# Patient Record
Sex: FEMALE | Race: WHITE | Employment: OTHER | URBAN - METROPOLITAN AREA
[De-identification: names, ages, dates, MRNs, and addresses within clinical notes are randomized per-mention and may not be internally consistent; named-entity substitution may affect disease eponyms.]

---

## 2017-01-02 ENCOUNTER — HOSPITAL ENCOUNTER (EMERGENCY)
Facility: CLINIC | Age: 72
Discharge: HOME | End: 2017-01-02
Attending: PHYSICIAN ASSISTANT | Admitting: PHYSICIAN ASSISTANT
Payer: MEDICARE

## 2017-01-02 DIAGNOSIS — N39.0 URINARY TRACT INFECTION IN FEMALE: ICD-10-CM

## 2017-01-02 DIAGNOSIS — R19.7 NAUSEA VOMITING AND DIARRHEA: ICD-10-CM

## 2017-01-02 DIAGNOSIS — R11.2 NAUSEA VOMITING AND DIARRHEA: ICD-10-CM

## 2017-01-02 LAB
ALBUMIN SERPL-MCNC: 3.7 G/DL (ref 3.4–5)
ALBUMIN UR-MCNC: 100 MG/DL
ALP SERPL-CCNC: 81 U/L (ref 40–150)
ALT SERPL W P-5'-P-CCNC: 37 U/L (ref 0–50)
ANION GAP SERPL CALCULATED.3IONS-SCNC: 18 MMOL/L (ref 3–14)
APPEARANCE UR: ABNORMAL
AST SERPL W P-5'-P-CCNC: 62 U/L (ref 0–45)
BACTERIA #/AREA URNS HPF: ABNORMAL /HPF
BASOPHILS # BLD AUTO: 0 10E9/L (ref 0–0.2)
BASOPHILS NFR BLD AUTO: 0.2 %
BILIRUB SERPL-MCNC: 0.6 MG/DL (ref 0.2–1.3)
BILIRUB UR QL STRIP: ABNORMAL
BUN SERPL-MCNC: 48 MG/DL (ref 7–30)
CALCIUM SERPL-MCNC: 8.6 MG/DL (ref 8.5–10.1)
CHLORIDE SERPL-SCNC: 101 MMOL/L (ref 94–109)
CO2 SERPL-SCNC: 21 MMOL/L (ref 20–32)
COLOR UR AUTO: YELLOW
CREAT SERPL-MCNC: 1.46 MG/DL (ref 0.52–1.04)
DIFFERENTIAL METHOD BLD: ABNORMAL
EOSINOPHIL # BLD AUTO: 0 10E9/L (ref 0–0.7)
EOSINOPHIL NFR BLD AUTO: 0.2 %
ERYTHROCYTE [DISTWIDTH] IN BLOOD BY AUTOMATED COUNT: 13.4 % (ref 10–15)
GFR SERPL CREATININE-BSD FRML MDRD: 35 ML/MIN/1.7M2
GLUCOSE SERPL-MCNC: 160 MG/DL (ref 70–99)
GLUCOSE UR STRIP-MCNC: NEGATIVE MG/DL
HCT VFR BLD AUTO: 32.8 % (ref 35–47)
HEMOCCULT STL QL: POSITIVE
HGB BLD-MCNC: 10.8 G/DL (ref 11.7–15.7)
HGB UR QL STRIP: ABNORMAL
IMM GRANULOCYTES # BLD: 0.1 10E9/L (ref 0–0.4)
IMM GRANULOCYTES NFR BLD: 0.4 %
KETONES UR STRIP-MCNC: 15 MG/DL
LEUKOCYTE ESTERASE UR QL STRIP: ABNORMAL
LYMPHOCYTES # BLD AUTO: 0.8 10E9/L (ref 0.8–5.3)
LYMPHOCYTES NFR BLD AUTO: 6.7 %
MCH RBC QN AUTO: 29.8 PG (ref 26.5–33)
MCHC RBC AUTO-ENTMCNC: 32.9 G/DL (ref 31.5–36.5)
MCV RBC AUTO: 91 FL (ref 78–100)
MONOCYTES # BLD AUTO: 0.8 10E9/L (ref 0–1.3)
MONOCYTES NFR BLD AUTO: 6.7 %
NEUTROPHILS # BLD AUTO: 10.6 10E9/L (ref 1.6–8.3)
NEUTROPHILS NFR BLD AUTO: 85.8 %
NITRATE UR QL: NEGATIVE
NON-SQ EPI CELLS #/AREA URNS LPF: ABNORMAL /LPF
PH UR STRIP: 6 PH (ref 5–7)
PLATELET # BLD AUTO: 349 10E9/L (ref 150–450)
POTASSIUM SERPL-SCNC: 3.7 MMOL/L (ref 3.4–5.3)
PROT SERPL-MCNC: 7.4 G/DL (ref 6.8–8.8)
RBC # BLD AUTO: 3.62 10E12/L (ref 3.8–5.2)
RBC #/AREA URNS AUTO: ABNORMAL /HPF (ref 0–2)
SODIUM SERPL-SCNC: 140 MMOL/L (ref 133–144)
SP GR UR STRIP: 1.02 (ref 1–1.03)
TRANS CELLS #/AREA URNS HPF: ABNORMAL /HPF
URN SPEC COLLECT METH UR: ABNORMAL
UROBILINOGEN UR STRIP-ACNC: 0.2 EU/DL (ref 0.2–1)
WBC # BLD AUTO: 12.3 10E9/L (ref 4–11)
WBC #/AREA URNS AUTO: >100 /HPF (ref 0–2)

## 2017-01-02 PROCEDURE — 25000125 ZZHC RX 250: Performed by: PHYSICIAN ASSISTANT

## 2017-01-02 PROCEDURE — 96361 HYDRATE IV INFUSION ADD-ON: CPT

## 2017-01-02 PROCEDURE — 82272 OCCULT BLD FECES 1-3 TESTS: CPT | Performed by: PHYSICIAN ASSISTANT

## 2017-01-02 PROCEDURE — 96376 TX/PRO/DX INJ SAME DRUG ADON: CPT

## 2017-01-02 PROCEDURE — 80053 COMPREHEN METABOLIC PANEL: CPT | Performed by: PHYSICIAN ASSISTANT

## 2017-01-02 PROCEDURE — 99284 EMERGENCY DEPT VISIT MOD MDM: CPT | Mod: 25

## 2017-01-02 PROCEDURE — 25000128 H RX IP 250 OP 636: Performed by: PHYSICIAN ASSISTANT

## 2017-01-02 PROCEDURE — 81001 URINALYSIS AUTO W/SCOPE: CPT | Performed by: PHYSICIAN ASSISTANT

## 2017-01-02 PROCEDURE — 85025 COMPLETE CBC W/AUTO DIFF WBC: CPT | Performed by: PHYSICIAN ASSISTANT

## 2017-01-02 PROCEDURE — 99284 EMERGENCY DEPT VISIT MOD MDM: CPT | Performed by: PHYSICIAN ASSISTANT

## 2017-01-02 PROCEDURE — 96374 THER/PROPH/DIAG INJ IV PUSH: CPT

## 2017-01-02 PROCEDURE — 96375 TX/PRO/DX INJ NEW DRUG ADDON: CPT

## 2017-01-02 RX ORDER — SODIUM CHLORIDE 9 MG/ML
1000 INJECTION, SOLUTION INTRAVENOUS CONTINUOUS
Status: DISCONTINUED | OUTPATIENT
Start: 2017-01-02 | End: 2017-01-03 | Stop reason: HOSPADM

## 2017-01-02 RX ORDER — ONDANSETRON 4 MG/1
4 TABLET, ORALLY DISINTEGRATING ORAL EVERY 8 HOURS PRN
Qty: 10 TABLET | Refills: 0 | Status: SHIPPED | OUTPATIENT
Start: 2017-01-02 | End: 2017-01-05

## 2017-01-02 RX ORDER — CIPROFLOXACIN 500 MG/1
500 TABLET, FILM COATED ORAL 2 TIMES DAILY
Qty: 6 TABLET | Refills: 0 | Status: SHIPPED | OUTPATIENT
Start: 2017-01-02 | End: 2017-01-05

## 2017-01-02 RX ORDER — ONDANSETRON 2 MG/ML
4 INJECTION INTRAMUSCULAR; INTRAVENOUS EVERY 30 MIN PRN
Status: DISCONTINUED | OUTPATIENT
Start: 2017-01-02 | End: 2017-01-03 | Stop reason: HOSPADM

## 2017-01-02 RX ADMIN — SODIUM CHLORIDE 1000 ML: 9 INJECTION, SOLUTION INTRAVENOUS at 18:34

## 2017-01-02 RX ADMIN — SODIUM CHLORIDE 1000 ML: 9 INJECTION, SOLUTION INTRAVENOUS at 19:39

## 2017-01-02 RX ADMIN — PROCHLORPERAZINE EDISYLATE 5 MG: 5 INJECTION INTRAMUSCULAR; INTRAVENOUS at 20:57

## 2017-01-02 RX ADMIN — ONDANSETRON HYDROCHLORIDE 4 MG: 2 SOLUTION INTRAMUSCULAR; INTRAVENOUS at 19:57

## 2017-01-02 RX ADMIN — ONDANSETRON HYDROCHLORIDE 4 MG: 2 SOLUTION INTRAMUSCULAR; INTRAVENOUS at 18:24

## 2017-01-02 ASSESSMENT — ENCOUNTER SYMPTOMS
DIFFICULTY URINATING: 0
BLOOD IN STOOL: 1
FEVER: 0
VOMITING: 1
APPETITE CHANGE: 1
DYSURIA: 0
DIARRHEA: 1
SHORTNESS OF BREATH: 0
NAUSEA: 1
CHILLS: 0
HEADACHES: 1

## 2017-01-02 NOTE — ED PROVIDER NOTES
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  She has not kept anything down including her home medications were clear liquids.  She denies localized abdominal pain, just mild diffuse cramping at times. No fever or chills. Denies hematemesis, but has had occasional bloody stools with wiping. She has tried anti-nausea medicine at home but it just gave her a headache.  Last emesis was around 4 PM.  No current chest pain or shortness of breath currently, though has had it intermittently in the last few days and is scheduled to get a stress test done in the near future.    I have reviewed the Medications, Allergies, Past Medical and Surgical History, and Social History in the Epic system.    Review of Systems   Constitutional: Positive for appetite change. Negative for fever and chills.   Respiratory: Negative for shortness of breath.    Cardiovascular: Negative for chest pain.   Gastrointestinal: Positive for nausea, vomiting, diarrhea and blood in stool.   Genitourinary: Negative for dysuria and difficulty urinating.   Neurological: Positive for headaches. Negative for syncope.       Physical Exam   BP: (!) 197/96 mmHg  Pulse: 75  Temp: 97  F (36.1  C)  Resp: 14  Height: 165.1 cm (5' 5\")  Weight: 81.647 kg (180 lb)  SpO2: 98 %  Physical Exam   Constitutional: She appears well-developed and well-nourished. No distress.   HENT:   Head: Normocephalic and atraumatic.   Eyes: Conjunctivae are normal.   Cardiovascular: Normal rate and regular rhythm.  Exam reveals no gallop and no friction rub.    No murmur heard.  Pulmonary/Chest: Breath sounds normal. No respiratory distress. She has no wheezes. She has no rales.   Abdominal: Soft. She exhibits distension (mild). " There is no tenderness. There is no rebound and no guarding.   Genitourinary: Rectal exam shows external hemorrhoid.   Neurological: She is alert.   Skin: Skin is warm and dry. She is not diaphoretic.   Nursing note and vitals reviewed.      ED Course   Procedures  None     Labs Ordered and Resulted from Time of ED Arrival Up to the Time of Departure from the ED   CBC WITH PLATELETS DIFFERENTIAL - Abnormal; Notable for the following:     WBC 12.3 (*)     RBC Count 3.62 (*)     Hemoglobin 10.8 (*)     Hematocrit 32.8 (*)     Absolute Neutrophil 10.6 (*)     All other components within normal limits   COMPREHENSIVE METABOLIC PANEL - Abnormal; Notable for the following:     Anion Gap 18 (*)     Glucose 160 (*)     Urea Nitrogen 48 (*)     Creatinine 1.46 (*)     GFR Estimate 35 (*)     GFR Estimate If Black 43 (*)     AST 62 (*)     All other components within normal limits   OCCULT BLOOD STOOL - Abnormal; Notable for the following:     Occult Blood Positive (*)     All other components within normal limits   UA WITH MICROSCOPIC - Abnormal; Notable for the following:     Bilirubin Urine   (*)     Value: Moderate  This is an unconfirmed screening test result. A positive result may be false.      Ketones Urine 15 (*)     Protein Albumin Urine 100 (*)     Blood Urine Moderate (*)     Leukocyte Esterase Urine Moderate (*)     WBC Urine >100 (*)     RBC Urine 5-10 (*)     Squamous Epithelial /LPF Urine Many (*)     Bacteria Urine Many (*)     All other components within normal limits   URINE CULTURE AEROBIC BACTERIAL       Assessments & Plan (with Medical Decision Making)  Makenzie Nevarez is a 71 year old female who presented to the emergency department with nausea, vomiting, and diarrhea.  Symptoms began 2 days ago and she has not tolerated anything by mouth including her home medications.  She has been afebrile during this time and was today.  She was also hypertensive at times during her ER stay, but otherwise vitals  were normal. She had no tenderness throughout her abdomen and appeared in no obvious distress. She was given IV fluids and Zofran with some relief of her symptoms. Continued to have mild nausea so we then tried Compazine.  Afterwards she was tolerating clear liquids without difficulty and reported feeling much better without nausea. She was up walking wither her walker without feeling weak or lightheaded. She essentially did not vomit throughout her ER stay, only one time of morning just spitting up small amount of clear fluid into an emesis bag.    I did not feel imaging was necessary today giving her lack of abdominal pain.  We did obtain labs which showed a mild leukocytosis.  Hemoglobin was 10.8, but viewing care everywhere this appears to be her baseline.  Her guaiac stool test was positive for blood, but she noted blood in her stools only after wiping so I suspect this is likely due to external hemorrhoids which I did see on exam.  Otherwise BUN/creatinine appeared at baseline.  Urine was suggestive of UTI with moderate leukocyte esterase, and > 100 WBCs, though patient denied urinary symptoms.  At this time, patient is reporting feeling markedly improved and tolerating PO. Vitals remained stable and she denied pain. She was deemed medically stable for discharge, at which point she stated she will coordinate a ride tomorrow. She grew frustrated that she'd have to go home tonight, but with no medical reason to keep her we were able to coordinate a ride with [facility] service. She was given prescription for ciprofloxacin to treat her UTI, and zofran to manage nausea if it recurs. She has an appointment on Wednesday with her PCP already scheduled where she can discuss this ER stay and her symptoms. I discussed warning signs and symptoms of when to return to the emergency department. Patient expressed understanding and was discharged to home.     I have reviewed the nursing notes.    I have reviewed the  findings, diagnosis, plan and need for follow up with the patient.    Discharge Medication List as of 1/2/2017 11:43 PM      START taking these medications    Details   ciprofloxacin (CIPRO) 500 MG tablet Take 1 tablet (500 mg) by mouth 2 times daily for 3 days, Disp-6 tablet, R-0, E-Prescribe      !! ondansetron (ZOFRAN ODT) 4 MG ODT tab Take 1 tablet (4 mg) by mouth every 8 hours as needed for nausea, Disp-10 tablet, R-0, E-Prescribe       !! - Potential duplicate medications found. Please discuss with provider.          Final diagnoses:   Nausea vomiting and diarrhea   Urinary tract infection in female       1/2/2017   [facility] EMERGENCY DEPARTMENT      Cassia Kemp PA-C  01/03/17 0031

## 2017-01-02 NOTE — ED AVS SNAPSHOT
[facility] Emergency Department    [address] [place] [address]    Phone:  [phone]    Fax:  [phone]                                       Makenzie Nevarez   MRN: [id number]    Department:  [facility] Emergency Department   Date of Visit:  1/2/2017           Patient Information     Date Of Birth          1945        Your diagnoses for this visit were:     Nausea vomiting and diarrhea     Urinary tract infection in female        You were seen by Cassia Kemp PA-C.      Follow-up Information     Follow up with Dez Rodriguez MD In 2 days.    Specialty:  Family Practice    Why:  At scheduled appointment for ER follow up    Contact information:    [address] [address]  [phone]          Follow up with [facility] Emergency Department.    Specialty:  EMERGENCY MEDICINE    Why:  If symptoms worsen    Contact information:    [address] [place] [address] [phone]    Additional information:    From [address]: Exit at [employer] on south side of [place]. Turn right on [address] Turn left at stoplight on [address]. [facility] will be in view two blocks ahead      Discharge References/Attachments     VOMITING AND DIARRHEA, NONSPECIFIC (ADULT) (ENGLISH)      Future Appointments        Provider Department Dept Phone Center    1/19/2017 2:30 PM CARDIO DEVICE NURSE [facility] [phone] [facility]    1/19/2017 3:00 PM Shelby Schaffer MD [facility] [phone] [facility]      24 Hour Appointment Hotline       To make an appointment at any [facility] call [phone] ([phone]). If you don't have a family doctor or clinic, we will help you find one. [facility] are conveniently located to serve the needs of you and your family.             Review of your medicines      START taking        Dose / Directions Last dose taken     ciprofloxacin 500 MG tablet   Commonly known as:  CIPRO   Dose:  500 mg   Quantity:  6 tablet        Take 1 tablet (500 mg) by mouth 2 times daily for 3 days   Refills:  0          CONTINUE these medicines which may have CHANGED, or have new prescriptions. If we are uncertain of the size of tablets/capsules you have at home, strength may be listed as something that might have changed.        Dose / Directions Last dose taken    * ondansetron 4 MG ODT tab   Commonly known as:  ZOFRAN-ODT   Dose:  4 mg   What changed:  Another medication with the same name was added. Make sure you understand how and when to take each.   Quantity:  15 tablet        Take 1 tablet (4 mg) by mouth every 6 hours as needed (Nausea and Vomiting)   Refills:  0        * ondansetron 4 MG ODT tab   Commonly known as:  ZOFRAN ODT   Dose:  4 mg   What changed:  You were already taking a medication with the same name, and this prescription was added. Make sure you understand how and when to take each.   Quantity:  10 tablet        Take 1 tablet (4 mg) by mouth every 8 hours as needed for nausea   Refills:  0        * Notice:  This list has 2 medication(s) that are the same as other medications prescribed for you. Read the directions carefully, and ask your doctor or other care provider to review them with you.      Our records show that you are taking the medicines listed below. If these are incorrect, please call your family doctor or clinic.        Dose / Directions Last dose taken    ACETAMINOPHEN PO   Dose:  500 mg        Take 500 mg by mouth every 6 hours as needed for pain   Refills:  0        apixaban ANTICOAGULANT 5 MG tablet   Commonly known as:  ELIQUIS   Dose:  5 mg   Quantity:  60 tablet        Take 1 tablet (5 mg) by mouth 2 times daily   Refills:  1        carvedilol 6.25 MG tablet   Commonly known as:  COREG   Dose:  6.25 mg   Quantity:  60 tablet        Take 1 tablet (6.25 mg) by mouth 2 times daily (with meals)   Refills:  0         GABAPENTIN PO   Dose:  300 mg        Take 300 mg by mouth 3 times daily   Refills:  0        hydrALAZINE 25 MG tablet   Commonly known as:  APRESOLINE   Quantity:  90 tablet        Take 2 tablets  3 times daily.   Refills:  3        HYDROcodone-acetaminophen 5-325 MG per tablet   Commonly known as:  NORCO   Dose:  1-2 tablet   Quantity:  30 tablet        Take 1-2 tablets by mouth every 4 hours as needed for moderate to severe pain   Refills:  0        LASIX 40 MG tablet   Dose:  40-80 mg   Quantity:  30 tablet   Generic drug:  furosemide        Take 1-2 tablets (40-80 mg) by mouth daily   Refills:  0        Multi-vitamin Tabs tablet   Dose:  1 tablet        Take 1 tablet by mouth daily   Refills:  0        NIFEdipine ER 60 MG 24 hr tablet   Commonly known as:  ADALAT CC   Dose:  60 mg        Take 60 mg by mouth daily   Refills:  0        potassium chloride 10 MEQ tablet   Commonly known as:  K-TAB,KLOR-CON   Dose:  10 mEq        Take 10 mEq by mouth daily   Refills:  0        SERTRALINE HCL PO   Dose:  100 mg        Take 100 mg by mouth At Bedtime   Refills:  0        TRAZODONE HCL PO   Dose:  50 mg        Take 50 mg by mouth nightly as needed   Refills:  0                Prescriptions were sent or printed at these locations (2 Prescriptions)                   [facility]   [address] [address]    Telephone:  [phone]   Fax:  [phone]   Hours:                  These medications are not ready yet because we are checking if your insurance will help you pay for them. Call your pharmacy to confirm that your medication is ready for pickup. It may take up to 24 hours for them to receive the prescription. If the prescription is not ready within 3 business days, please contact your clinic or your provider (2 of 2)         ciprofloxacin (CIPRO) 500 MG tablet               ondansetron (ZOFRAN ODT) 4 MG ODT tab                Procedures and tests performed during your  "visit     CBC with platelets differential    Comprehensive metabolic panel    Occult blood stool    UA with Microscopic    Urine Culture      Orders Needing Specimen Collection     None      Pending Results     No orders found from 2017 to 1/3/2017.            Thank you for choosing [facility]       Thank you for choosing [facility] for your care. Our goal is always to provide you with excellent care. Hearing back from our patients is one way we can continue to improve our services. Please take a few minutes to complete the written survey that you may receive in the mail after you visit with us. Thank you!        [email or web address] Information     [facility] [place] [facility] lets you send messages to your doctor, view your test results, renew your prescriptions, schedule appointments and more. To sign up, go to [email or web address] [facility] . Click on \"Log in\" on the left side of the screen, which will take you to the Welcome page. Then click on \"Sign up Now\" on the right side of the page.     You will be asked to enter the access code listed below, as well as some personal information. Please follow the directions to create your username and password.     Your access code is: [email or web address]  Expires: 2017 11:42 PM     Your access code will  in 90 days. If you need help or a new code, please call your [facility] clinic or [phone].        After Visit Summary       This is your record. Keep this with you and show to your community pharmacist(s) and doctor(s) at your next visit.                  "

## 2017-01-02 NOTE — ED AVS SNAPSHOT
[facility] Emergency Department    [address]     [place] [address]    Phone:  [phone]    Fax:  [phone]                                       Makenzie Nevarez   MRN: [id number]    Department:  [facility] Emergency Department   Date of Visit:  1/2/2017           After Visit Summary Signature Page     I have received my discharge instructions, and my questions have been answered. I have discussed any challenges I see with this plan with the nurse or doctor.    ..........................................................................................................................................  Patient/Patient Representative Signature      ..........................................................................................................................................  Patient Representative Print Name and Relationship to Patient    ..................................................               ................................................  Date                                            Time    ..........................................................................................................................................  Reviewed by Signature/Title    ...................................................              ..............................................  Date                                                            Time

## 2017-01-03 VITALS
DIASTOLIC BLOOD PRESSURE: 87 MMHG | HEIGHT: 65 IN | TEMPERATURE: 97 F | WEIGHT: 180 LBS | BODY MASS INDEX: 29.99 KG/M2 | HEART RATE: 75 BPM | SYSTOLIC BLOOD PRESSURE: 178 MMHG | RESPIRATION RATE: 18 BRPM | OXYGEN SATURATION: 98 %

## 2017-01-19 ENCOUNTER — OFFICE VISIT (OUTPATIENT)
Dept: CARDIOLOGY | Facility: CLINIC | Age: 72
End: 2017-01-19
Payer: COMMERCIAL

## 2017-01-19 VITALS
OXYGEN SATURATION: 96 % | HEART RATE: 70 BPM | DIASTOLIC BLOOD PRESSURE: 74 MMHG | SYSTOLIC BLOOD PRESSURE: 152 MMHG | HEIGHT: 64 IN | WEIGHT: 194 LBS | BODY MASS INDEX: 33.12 KG/M2

## 2017-01-19 DIAGNOSIS — Z95.0 CARDIAC PACEMAKER IN SITU: ICD-10-CM

## 2017-01-19 DIAGNOSIS — I48.0 PAROXYSMAL ATRIAL FIBRILLATION (H): ICD-10-CM

## 2017-01-19 DIAGNOSIS — I10 ESSENTIAL HYPERTENSION: Primary | ICD-10-CM

## 2017-01-19 PROCEDURE — 99214 OFFICE O/P EST MOD 30 MIN: CPT | Mod: 24 | Performed by: INTERNAL MEDICINE

## 2017-01-19 PROCEDURE — 93280 PM DEVICE PROGR EVAL DUAL: CPT

## 2017-01-19 RX ORDER — CARVEDILOL 25 MG/1
25 TABLET ORAL 2 TIMES DAILY WITH MEALS
Qty: 180 TABLET | Refills: 3 | Status: SHIPPED | OUTPATIENT
Start: 2017-01-19 | End: 2017-11-13

## 2017-01-19 NOTE — PROGRESS NOTES
[place] [facility] (D)/ 7 week check Pacemaker Device Check/Dr Schaffer/Мария  AP: 22 % : 35 %  Mode: DDDR        Underlying Rhythm: AF/ intermittent block/ high degree  Heart Rate: Histogram shows an adequate HR distribution  Sensing: Stable    Pacing Threshold: stable   Impedance: WnL  Battery Status: New  Atrial Arrhythmia: persistent AF since last 11-29-16 (20% burden)  Ventricular Arrhythmia: NONE  Setting Change: NONE    Care Plan: Remote in 3 months. Pt is not on AC/ seeing Dr Schaffer today

## 2017-01-19 NOTE — Clinical Note
2017      Dez Rodriguez MD   [address]  [address]      RE: Makenzie Nevarez   MRN: [id number]   : 1945      Dear Dr. Rodriguez:      I saw Ms. Nevarez for evaluation of sinus node dysfunction.  She is a 71-year-old white female who was found to have severe sinus node dysfunction, AV conduction problems when she was in the hospital for carpal tunnel syndrome procedure.  The patient was seen by Dr. Rasta Cruz who recommended pacemaker implantation for symptomatic sinus node dysfunction.  The patient received a dual-chamber pacemaker implant on 2016.  The procedure was performed by Dr. Harper and she had no complications.  According to Dr. Harper' report, the patient had both sinus node dysfunction and AV conduction problem.  Subsequently, the patient has been known to have paroxysmal atrial fibrillation but no rapid ventricular rate.  The patient has been on Eliquis for anticoagulation.      The patient is here for pacemaker check this morning.  I was informed that the family members initially refused to have the pacemaker interrogation and demanded to the doctor first. After explanation by the RN, the family members eventually agreed to have the pacemaker interrogation. She did have confirmed atrial fibrillation with ventricular pacing and no rapid ventricular rate.  The pacemaker function is normal.      She, at the present time, denies palpitation, shortness of breath or fatigue.  She stated that she has been taking Lasix for significant leg edema.  She does acknowledge that she drinks a lot of water.      PHYSICAL EXAMINATION:   VITAL SIGNS:  On examination, blood pressure was 152/74, heart rate 70 beats per minute, body weight 194 pounds.  The pacemaker site has healed well.   LUNGS:  Clear.   CARDIAC:  The cardiac rhythm was regular and the heart sounds were normal without murmur.   ABDOMEN:  Showed moderate obesity.     EXTREMITIES:  She had bilateral 2+ leg  "edema.  The left side appeared to be more severe than they right and she has on compressing stockings.      The patient came in with her  and other 2 men claiming to be her brothers.  One of the men was started to express the \"concerns\" near the end of the clinic visit. He apparently was extremely upset for unknown reasons.  He started by stating that the patient has not been getting good primary care, believing that his sister has been having heart problems for years without getting the attention until the recent carpal tunnel syndrome.  He then complained about the recent ER visit.  It appeared to me that she went to the ER and was subsequently discharged from the ER without hospitalization.  He was very [place] that the ER doctor did not take good care of his sister.  He then requested my opinion regarding a different primary care doctor.  I stated that we have many good primary care physicians around the community and he can do some investigation according to communication with friends and neighbors.  To my surprise, he then vented on me, stating that I was rushing out and tried to push this issue aside while I was sitting in the room to listen to him.  I did try to quietly listen to his complaints.  I expressed to him that I will document his complaints and it will be in the report.  He was also encouraged to send his complaint to the hospital  regarding the issues.      The patient has been asked to drink less water since she has apparent renal insufficiency.  She is encouraged to continue Lasix 40 mg once a day and the dosage may have to be adjusted further up if she continues to have leg edema.      Her blood pressure was very high on 12/19 and is still elevated today.  I increased the dose of carvedilol to 25 mg p.o. b.i.d.  She is taking nifedipine, which could aggravate leg edema and I discontinued that medicine.  The patient will be scheduled to return to Cardiology Clinic for " assessment of blood pressure control and leg edema.  We may have to further titrate the blood pressure medications in the near future.   She has been asked to reduce the amount of free water intake.  I placed an order to see her in next 1-2 months.    Sincerely,      Shelby Schaffer MD

## 2017-01-19 NOTE — LETTER
1/19/2017    RE: Makenzie Nevarez  [address] [facility] [address]       Dear Colleague,    Thank you for the opportunity to participate in the care of your patient, Makenzie Nevarez, at the [facility]. Please see a copy of my visit note below.    HPI and Plan:   See dictation    Orders Placed This Encounter   Procedures     Follow-Up with Cardiac Advanced Practice Provider     Follow-Up with Electrophysiologist       Orders Placed This Encounter   Medications     carvedilol (COREG) 25 MG tablet     Sig: Take 1 tablet (25 mg) by mouth 2 times daily (with meals)     Dispense:  180 tablet     Refill:  3       Medications Discontinued During This Encounter   Medication Reason     carvedilol (COREG) 6.25 MG tablet      NIFEdipine ER (ADALAT CC) 60 MG 24 hr tablet          Encounter Diagnoses   Name Primary?     Essential hypertension Yes     Paroxysmal atrial fibrillation (H)        CURRENT MEDICATIONS:  Current Outpatient Prescriptions   Medication Sig Dispense Refill     carvedilol (COREG) 25 MG tablet Take 1 tablet (25 mg) by mouth 2 times daily (with meals) 180 tablet 3     hydrALAZINE (APRESOLINE) 25 MG tablet Take 2 tablets  3 times daily. 90 tablet 3     apixaban ANTICOAGULANT (ELIQUIS) 5 MG tablet Take 1 tablet (5 mg) by mouth 2 times daily 60 tablet 1     furosemide (LASIX) 40 MG tablet Take 1-2 tablets (40-80 mg) by mouth daily 30 tablet      potassium chloride (K-TAB,KLOR-CON) 10 MEQ tablet Take 10 mEq by mouth daily       GABAPENTIN PO Take 300 mg by mouth 3 times daily        SERTRALINE HCL PO Take 100 mg by mouth At Bedtime        TRAZODONE HCL PO Take 50 mg by mouth nightly as needed        ondansetron (ZOFRAN-ODT) 4 MG disintegrating tablet Take 1 tablet (4 mg) by mouth every 6 hours as needed (Nausea and Vomiting) 15 tablet 0     [DISCONTINUED] carvedilol (COREG) 6.25 MG tablet Take 1 tablet (6.25 mg) by mouth 2 times daily (with  meals) 60 tablet 0     HYDROcodone-acetaminophen (NORCO) 5-325 MG per tablet Take 1-2 tablets by mouth every 4 hours as needed for moderate to severe pain 30 tablet 0     ACETAMINOPHEN PO Take 500 mg by mouth every 6 hours as needed for pain       multivitamin, therapeutic with minerals (MULTI-VITAMIN) TABS Take 1 tablet by mouth daily         ALLERGIES     Allergies   Allergen Reactions     Sulfa Drugs Hives       PAST MEDICAL HISTORY:  Past Medical History   Diagnosis Date     Diabetes (H)      Gout      HTN (hypertension)      Migraine      Arthritis      Sinus node dysfunction (H)      sp DDD PM 11/21/2016     Paroxysmal atrial fibrillation (H)      Chronic renal insufficiency        PAST SURGICAL HISTORY:  Past Surgical History   Procedure Laterality Date     Knee surgery       C pelvis/hip joint surgery unlisted       Appendectomy       Cholecystectomy       Adrenal surgery       Toe surgery       Cataract iol, rt/lt       As total hip arthroplasty       Release carpal tunnel Right 11/18/2016     Procedure: RELEASE CARPAL TUNNEL;  Surgeon: Elmer Soliz MD;  Location: [facility]       FAMILY HISTORY:  No family history on file.    SOCIAL HISTORY:  Social History     Social History     Marital Status:      Spouse Name: N/A     Number of Children: N/A     Years of Education: N/A     Social History Main Topics     Smoking status: Never Smoker      Smokeless tobacco: Not on file     Alcohol Use: No     Drug Use: No     Sexual Activity: Not on file     Other Topics Concern     Not on file     Social History Narrative       Review of Systems:  Skin:  Negative       Eyes:  Negative      ENT:  Negative      Respiratory:  Negative       Cardiovascular:  Negative for;palpitations;chest pain Positive for;edema;fatigue;lightheadedness right leg swelling   Gastroenterology: Negative      Genitourinary:  Negative      Musculoskeletal:  Negative      Neurologic:  Positive for headaches occ. HA  Psychiatric:   "Positive for depression;sleep disturbances has trazedone if needs it  Heme/Lymph/Imm:  Positive for allergies    Endocrine:  Negative        Physical Exam:  Vitals: /74 mmHg  Pulse 70  Ht 1.626 m (5' 4\")  Wt 87.998 kg (194 lb)  BMI 33.28 kg/m2  SpO2 96%    Constitutional:  cooperative, alert and oriented, well developed, well nourished, in no acute distress        Skin:  warm and dry to the touch, no apparent skin lesions or masses noted        Head:  normocephalic, no masses or lesions        Eyes:  pupils equal and round, conjunctivae and lids unremarkable, sclera white, no xanthalasma, EOMS intact, no nystagmus        ENT:  no pallor or cyanosis, dentition good        Neck:  carotid pulses are full and equal bilaterally, JVP normal, no carotid bruit, no thyromegaly        Chest:  normal breath sounds, clear to auscultation, normal A-P diameter, normal symmetry, normal respiratory excursion, no use of accessory muscles          Cardiac: regular rhythm, normal S1/S2, no S3 or S4, apical impulse not displaced, no murmurs, gallops or rubs                  Abdomen:  abdomen soft, non-tender, BS normoactive, no mass, no HSM, no bruits        Vascular: pulses full and equal, no bruits auscultated                                        Extremities and Back:      2+;bilateral LE edema          Neurological:  affect appropriate, oriented to time, person and place        CC  No referring provider defined for this encounter.        2017      Dez Rodriguez MD   [address]  [address]      RE: Makenzie Nevarez   MRN: [id number]   : 1945      Dear Dr. Rodriguez:      I saw Ms. Nevarez for evaluation of sinus node dysfunction.  She is a 71-year-old white female who was found to have severe sinus node dysfunction, AV conduction problems when she was in the hospital for carpal tunnel syndrome procedure.  The patient was seen by Dr. Rasta Cruz who recommended pacemaker " "implantation for symptomatic sinus node dysfunction.  The patient received a dual-chamber pacemaker implant on 11/21/2016.  The procedure was performed by Dr. Harper and she had no complications.  According to Dr. Harper' report, the patient had both sinus node dysfunction and AV conduction problem.  Subsequently, the patient has been known to have paroxysmal atrial fibrillation but no rapid ventricular rate.  The patient has been on Eliquis for anticoagulation.      The patient is here for pacemaker check this morning.  I was informed that the family members initially refused to have the pacemaker interrogation and demanded to the doctor first. After explanation by the RN, the family members eventually agreed to have the pacemaker interrogation. She did have confirmed atrial fibrillation with ventricular pacing and no rapid ventricular rate.  The pacemaker function is normal.      She, at the present time, denies palpitation, shortness of breath or fatigue.  She stated that she has been taking Lasix for significant leg edema.  She does acknowledge that she drinks a lot of water.      PHYSICAL EXAMINATION:   VITAL SIGNS:  On examination, blood pressure was 152/74, heart rate 70 beats per minute, body weight 194 pounds.  The pacemaker site has healed well.   LUNGS:  Clear.   CARDIAC:  The cardiac rhythm was regular and the heart sounds were normal without murmur.   ABDOMEN:  Showed moderate obesity.     EXTREMITIES:  She had bilateral 2+ leg edema.  The left side appeared to be more severe than they right and she has on compressing stockings.      The patient came in with her  and other 2 men claiming to be her brothers.  One of the men was started to express the \"concerns\" near the end of the clinic visit. He apparently was extremely upset for unknown reasons.  He started by stating that the patient has not been getting good primary care, believing that his sister has been having heart problems for years " without getting the attention until the recent carpal tunnel syndrome.  He then complained about the recent ER visit.  It appeared to me that she went to the ER and was subsequently discharged from the ER without hospitalization.  He was very [place] that the ER doctor did not take good care of his sister.  He then requested my opinion regarding a different primary care doctor.  I stated that we have many good primary care physicians around the community and he can do some investigation according to communication with friends and neighbors.  To my surprise, he then vented on me, stating that I was rushing out and tried to push this issue aside while I was sitting in the room to listen to him.  I did try to quietly listen to his complaints.  I expressed to him that I will document his complaints and it will be in the report.  He was also encouraged to send his complaint to the hospital  regarding the issues.      The patient has been asked to drink less water since she has apparent renal insufficiency.  She is encouraged to continue Lasix 40 mg once a day and the dosage may have to be adjusted further up if she continues to have leg edema.      Her blood pressure was very high on  and is still elevated today.  I increased the dose of carvedilol to 25 mg p.o. b.i.d.  She is taking nifedipine, which could aggravate leg edema and I discontinued that medicine.  The patient will be scheduled to return to Cardiology Clinic for assessment of blood pressure control and leg edema.  We may have to further titrate the blood pressure medications in the near future.   She has been asked to reduce the amount of free water intake.  I placed an order to see her in next 1-2 months.    Sincerely,      Shelby Schaffer MD      D: 2017 15:52   T: 2017 11:22   MT: [id number]    Name:     JAMAICA MITCHELL   MRN:      [id number]        Account:      [id number]   :      1945           Service Date: 2017     Document: [id number]

## 2017-01-19 NOTE — MR AVS SNAPSHOT
After Visit Summary   1/19/2017    Makenzie Nevarez    MRN: [id number]           Patient Information     Date Of Birth          1945        Visit Information        Provider Department      1/19/2017 3:00 PM Shelby Schaffer MD [facility]        Today's Diagnoses     Essential hypertension    -  1     Paroxysmal atrial fibrillation (H)            Follow-ups after your visit        Additional Services     Follow-Up with Electrophysiologist           Follow-Up with Electrophysiologist                 Your next 10 appointments already scheduled     Apr 27, 2017  4:00 PM   Remote PPM Check with SAMPSON TECH1   [facility] [place] ([facility])    [address] [place], [place] [address]  [facility] [address] [phone]           This appointment is for a remote check of your pacemaker.  This is not an appointment at the office.              Future tests that were ordered for you today     Open Future Orders        Priority Expected Expires Ordered    Follow-Up with Electrophysiologist Routine 2/18/2017 1/19/2019 1/19/2017    Follow-Up with Electrophysiologist Routine 1/19/2018 6/3/2018 1/19/2017            Who to contact     If you have questions or need follow up information about today's clinic visit or your schedule please contact [facility] directly at [phone].  Normal or non-critical lab and imaging results will be communicated to you by MyChart, letter or phone within 4 business days after the clinic has received the results. If you do not hear from us within 7 days, please contact the clinic through MyChart or phone. If you have a critical or abnormal lab result, we will notify you by phone as soon as possible.  Submit refill requests through [employer] or call your pharmacy and they will forward the refill request to us. Please allow 3 business days for your refill to be completed.          Additional Information About Your Visit       "  MyChart Information     [employer] lets you send messages to your doctor, view your test results, renew your prescriptions, schedule appointments and more. To sign up, go to [email or web address] . Click on \"Log in\" on the left side of the screen, which will take you to the Welcome page. Then click on \"Sign up Now\" on the right side of the page.     You will be asked to enter the access code listed below, as well as some personal information. Please follow the directions to create your username and password.     Your access code is: [email or web address]  Expires: 2017 11:42 PM     Your access code will  in 90 days. If you need help or a new code, please call your [facility] clinic or [phone].        Care EveryWhere ID     This is your Care EveryWhere ID. This could be used by other organizations to access your [facility] medical records  [id number]        Your Vitals Were     Pulse Height BMI (Body Mass Index) Pulse Oximetry          70 1.626 m (5' 4\") 33.28 kg/m2 96%         Blood Pressure from Last 3 Encounters:   17 152/74   17 178/87   16 176/76    Weight from Last 3 Encounters:   17 87.998 kg (194 lb)   17 81.647 kg (180 lb)   16 87.816 kg (193 lb 9.6 oz)                 Today's Medication Changes          These changes are accurate as of: 17  3:58 PM.  If you have any questions, ask your nurse or doctor.               These medicines have changed or have updated prescriptions.        Dose/Directions    carvedilol 25 MG tablet   Commonly known as:  COREG   This may have changed:    - medication strength  - how much to take   Used for:  Essential hypertension   Changed by:  Shelby Schaffer MD        Dose:  25 mg   Take 1 tablet (25 mg) by mouth 2 times daily (with meals)   Quantity:  180 tablet   Refills:  3         Stop taking these medicines if you haven't already. Please contact your care team if you have questions.     NIFEdipine ER 60 MG 24 hr tablet   Commonly " known as:  ADALAT CC   Stopped by:  Shelby Schaffer MD                Where to get your medicines      These medications were sent to [facility] - [place], [place] - [address] AT [facility] [address]  [address], [place] [address]     Phone:  [phone]    - carvedilol 25 MG tablet             Primary Care Provider Office Phone # Fax #    Dez Rodriguez [phone]       [facility] [address] [address]        Thank you!     Thank you for choosing [facility]  for your care. Our goal is always to provide you with excellent care. Hearing back from our patients is one way we can continue to improve our services. Please take a few minutes to complete the written survey that you may receive in the mail after your visit with us. Thank you!             Your Updated Medication List - Protect others around you: Learn how to safely use, store and throw away your medicines at www.disposemymeds.org.          This list is accurate as of: 1/19/17  3:58 PM.  Always use your most recent med list.                   Brand Name Dispense Instructions for use    ACETAMINOPHEN PO      Take 500 mg by mouth every 6 hours as needed for pain       apixaban ANTICOAGULANT 5 MG tablet    ELIQUIS    60 tablet    Take 1 tablet (5 mg) by mouth 2 times daily       carvedilol 25 MG tablet    COREG    180 tablet    Take 1 tablet (25 mg) by mouth 2 times daily (with meals)       GABAPENTIN PO      Take 300 mg by mouth 3 times daily       hydrALAZINE 25 MG tablet    APRESOLINE    90 tablet    Take 2 tablets  3 times daily.       HYDROcodone-acetaminophen 5-325 MG per tablet    NORCO    30 tablet    Take 1-2 tablets by mouth every 4 hours as needed for moderate to severe pain       LASIX 40 MG tablet   Generic drug:  furosemide     30 tablet    Take 1-2 tablets (40-80 mg) by mouth daily       Multi-vitamin Tabs tablet      Take 1 tablet by mouth daily        ondansetron 4 MG ODT tab    ZOFRAN-ODT    15 tablet    Take 1 tablet (4 mg) by mouth every 6 hours as needed (Nausea and Vomiting)       potassium chloride 10 MEQ tablet    K-TAB,KLOR-CON     Take 10 mEq by mouth daily       SERTRALINE HCL PO      Take 100 mg by mouth At Bedtime       TRAZODONE HCL PO      Take 50 mg by mouth nightly as needed

## 2017-01-19 NOTE — PROGRESS NOTES
HPI and Plan:   See dictation    Orders Placed This Encounter   Procedures     Follow-Up with Cardiac Advanced Practice Provider     Follow-Up with Electrophysiologist       Orders Placed This Encounter   Medications     carvedilol (COREG) 25 MG tablet     Sig: Take 1 tablet (25 mg) by mouth 2 times daily (with meals)     Dispense:  180 tablet     Refill:  3       Medications Discontinued During This Encounter   Medication Reason     carvedilol (COREG) 6.25 MG tablet      NIFEdipine ER (ADALAT CC) 60 MG 24 hr tablet          Encounter Diagnoses   Name Primary?     Essential hypertension Yes     Paroxysmal atrial fibrillation (H)        CURRENT MEDICATIONS:  Current Outpatient Prescriptions   Medication Sig Dispense Refill     carvedilol (COREG) 25 MG tablet Take 1 tablet (25 mg) by mouth 2 times daily (with meals) 180 tablet 3     hydrALAZINE (APRESOLINE) 25 MG tablet Take 2 tablets  3 times daily. 90 tablet 3     apixaban ANTICOAGULANT (ELIQUIS) 5 MG tablet Take 1 tablet (5 mg) by mouth 2 times daily 60 tablet 1     furosemide (LASIX) 40 MG tablet Take 1-2 tablets (40-80 mg) by mouth daily 30 tablet      potassium chloride (K-TAB,KLOR-CON) 10 MEQ tablet Take 10 mEq by mouth daily       GABAPENTIN PO Take 300 mg by mouth 3 times daily        SERTRALINE HCL PO Take 100 mg by mouth At Bedtime        TRAZODONE HCL PO Take 50 mg by mouth nightly as needed        ondansetron (ZOFRAN-ODT) 4 MG disintegrating tablet Take 1 tablet (4 mg) by mouth every 6 hours as needed (Nausea and Vomiting) 15 tablet 0     [DISCONTINUED] carvedilol (COREG) 6.25 MG tablet Take 1 tablet (6.25 mg) by mouth 2 times daily (with meals) 60 tablet 0     HYDROcodone-acetaminophen (NORCO) 5-325 MG per tablet Take 1-2 tablets by mouth every 4 hours as needed for moderate to severe pain 30 tablet 0     ACETAMINOPHEN PO Take 500 mg by mouth every 6 hours as needed for pain       multivitamin, therapeutic with minerals (MULTI-VITAMIN) TABS Take 1 tablet  "by mouth daily         ALLERGIES     Allergies   Allergen Reactions     Sulfa Drugs Hives       PAST MEDICAL HISTORY:  Past Medical History   Diagnosis Date     Diabetes (H)      Gout      HTN (hypertension)      Migraine      Arthritis      Sinus node dysfunction (H)      sp DDD PM 11/21/2016     Paroxysmal atrial fibrillation (H)      Chronic renal insufficiency        PAST SURGICAL HISTORY:  Past Surgical History   Procedure Laterality Date     Knee surgery       C pelvis/hip joint surgery unlisted       Appendectomy       Cholecystectomy       Adrenal surgery       Toe surgery       Cataract iol, rt/lt       As total hip arthroplasty       Release carpal tunnel Right 11/18/2016     Procedure: RELEASE CARPAL TUNNEL;  Surgeon: Elmer Soliz MD;  [facility]: [facility]       FAMILY HISTORY:  No family history on file.    SOCIAL HISTORY:  Social History     Social History     Marital Status:      Spouse Name: N/A     Number of Children: N/A     Years of Education: N/A     Social History Main Topics     Smoking status: Never Smoker      Smokeless tobacco: Not on file     Alcohol Use: No     Drug Use: No     Sexual Activity: Not on file     Other Topics Concern     Not on file     Social History Narrative       Review of Systems:  Skin:  Negative       Eyes:  Negative      ENT:  Negative      Respiratory:  Negative       Cardiovascular:  Negative for;palpitations;chest pain Positive for;edema;fatigue;lightheadedness right leg swelling   Gastroenterology: Negative      Genitourinary:  Negative      Musculoskeletal:  Negative      Neurologic:  Positive for headaches occ. HA  Psychiatric:  Positive for depression;sleep disturbances has trazedone if needs it  Heme/Lymph/Imm:  Positive for allergies    Endocrine:  Negative        Physical Exam:  Vitals: /74 mmHg  Pulse 70  Ht 1.626 m (5' 4\")  Wt 87.998 kg (194 lb)  BMI 33.28 kg/m2  SpO2 96%    Constitutional:  cooperative, alert and oriented, well " developed, well nourished, in no acute distress        Skin:  warm and dry to the touch, no apparent skin lesions or masses noted        Head:  normocephalic, no masses or lesions        Eyes:  pupils equal and round, conjunctivae and lids unremarkable, sclera white, no xanthalasma, EOMS intact, no nystagmus        ENT:  no pallor or cyanosis, dentition good        Neck:  carotid pulses are full and equal bilaterally, JVP normal, no carotid bruit, no thyromegaly        Chest:  normal breath sounds, clear to auscultation, normal A-P diameter, normal symmetry, normal respiratory excursion, no use of accessory muscles          Cardiac: regular rhythm, normal S1/S2, no S3 or S4, apical impulse not displaced, no murmurs, gallops or rubs                  Abdomen:  abdomen soft, non-tender, BS normoactive, no mass, no HSM, no bruits        Vascular: pulses full and equal, no bruits auscultated                                        Extremities and Back:      2+;bilateral LE edema          Neurological:  affect appropriate, oriented to time, person and place              CC  No referring provider defined for this encounter.

## 2017-01-20 NOTE — PROGRESS NOTES
2017      Dez Rodriguez MD   [address]  [address]      RE: Makenzie Nevarez   MRN: [id number]   : 1945      Dear Dr. Rodriguez:      I saw Ms. Nevarez for evaluation of sinus node dysfunction.  She is a 71-year-old white female who was found to have severe sinus node dysfunction, AV conduction problems when she was in the hospital for carpal tunnel syndrome procedure.  The patient was seen by Dr. Rasta Cruz who recommended pacemaker implantation for symptomatic sinus node dysfunction.  The patient received a dual-chamber pacemaker implant on 2016.  The procedure was performed by Dr. Harper and she had no complications.  According to Dr. Harper' report, the patient had both sinus node dysfunction and AV conduction problem.  Subsequently, the patient has been known to have paroxysmal atrial fibrillation but no rapid ventricular rate.  The patient has been on Eliquis for anticoagulation.      The patient is here for pacemaker check this morning.  I was informed that the family members initially refused to have the pacemaker interrogation and demanded to the doctor first. After explanation by the RN, the family members eventually agreed to have the pacemaker interrogation. She did have confirmed atrial fibrillation with ventricular pacing and no rapid ventricular rate.  The pacemaker function is normal.      She, at the present time, denies palpitation, shortness of breath or fatigue.  She stated that she has been taking Lasix for significant leg edema.  She does acknowledge that she drinks a lot of water.      PHYSICAL EXAMINATION:   VITAL SIGNS:  On examination, blood pressure was 152/74, heart rate 70 beats per minute, body weight 194 pounds.  The pacemaker site has healed well.   LUNGS:  Clear.   CARDIAC:  The cardiac rhythm was regular and the heart sounds were normal without murmur.   ABDOMEN:  Showed moderate obesity.     EXTREMITIES:  She had bilateral 2+ leg  "edema.  The left side appeared to be more severe than they right and she has on compressing stockings.      The patient came in with her  and other 2 men claiming to be her brothers.  One of the men was started to express the \"concerns\" near the end of the clinic visit. He apparently was extremely upset for unknown reasons.  He started by stating that the patient has not been getting good primary care, believing that his sister has been having heart problems for years without getting the attention until the recent carpal tunnel syndrome.  He then complained about the recent ER visit.  It appeared to me that she went to the ER and was subsequently discharged from the ER without hospitalization.  He was very [place] that the ER doctor did not take good care of his sister.  He then requested my opinion regarding a different primary care doctor.  I stated that we have many good primary care physicians around the community and he can do some investigation according to communication with friends and neighbors.  To my surprise, he then vented on me, stating that I was rushing out and tried to push this issue aside while I was sitting in the room to listen to him.  I did try to quietly listen to his complaints.  I expressed to him that I will document his complaints and it will be in the report.  He was also encouraged to send his complaint to the hospital  regarding the issues.      The patient has been asked to drink less water since she has apparent renal insufficiency.  She is encouraged to continue Lasix 40 mg once a day and the dosage may have to be adjusted further up if she continues to have leg edema.      Her blood pressure was very high on 12/19 and is still elevated today.  I increased the dose of carvedilol to 25 mg p.o. b.i.d.  She is taking nifedipine, which could aggravate leg edema and I discontinued that medicine.  The patient will be scheduled to return to [facility]iology Clinic for " assessment of blood pressure control and leg edema.  We may have to further titrate the blood pressure medications in the near future.   She has been asked to reduce the amount of free water intake.  I placed an order to see her in next 1-2 months.    Sincerely,      MD MADELINE Duran MD             D: 2017 15:52   T: 2017 11:22   MT: [email or web address]      Name:     JAMAICA MITCHELL   MRN:      [id number]        Account:      [id number]   :      1945           Service Date: 2017      Document: [id number]

## 2017-01-26 ENCOUNTER — TELEPHONE (OUTPATIENT)
Dept: CARDIOLOGY | Facility: CLINIC | Age: 72
End: 2017-01-26

## 2017-01-26 NOTE — TELEPHONE ENCOUNTER
Received call from Ruthy RN from [facility] reporting that per [facility] records, patient's hydralazine dosing is at 3x/day versus [facility] records of 2x/day. DEXTER Vora prescribed medication at last OV. Ruthy is going to set up medication per [facility] records and will contact [facility] to update their records. Josie

## 2017-03-21 DIAGNOSIS — I10 ESSENTIAL HYPERTENSION: ICD-10-CM

## 2017-03-21 RX ORDER — HYDRALAZINE HYDROCHLORIDE 25 MG/1
50 TABLET, FILM COATED ORAL 3 TIMES DAILY
Qty: 540 TABLET | Refills: 3 | Status: SHIPPED | OUTPATIENT
Start: 2017-03-21

## 2017-11-13 DIAGNOSIS — I10 ESSENTIAL HYPERTENSION: ICD-10-CM

## 2017-11-13 RX ORDER — CARVEDILOL 25 MG/1
25 TABLET ORAL 2 TIMES DAILY WITH MEALS
Qty: 180 TABLET | Refills: 0 | Status: SHIPPED | OUTPATIENT
Start: 2017-11-13 | End: 2018-02-22

## 2018-02-22 DIAGNOSIS — I10 ESSENTIAL HYPERTENSION: ICD-10-CM

## 2018-02-22 RX ORDER — CARVEDILOL 25 MG/1
25 TABLET ORAL 2 TIMES DAILY WITH MEALS
Qty: 180 TABLET | Refills: 0 | Status: SHIPPED | OUTPATIENT
Start: 2018-02-22

## 2019-10-30 ENCOUNTER — THERAPY VISIT (OUTPATIENT)
Dept: PHYSICAL THERAPY | Facility: CLINIC | Age: 74
End: 2019-10-30
Payer: COMMERCIAL

## 2019-10-30 DIAGNOSIS — M25.552 HIP PAIN, LEFT: ICD-10-CM

## 2019-10-30 PROCEDURE — 97161 PT EVAL LOW COMPLEX 20 MIN: CPT | Mod: GP | Performed by: PHYSICAL THERAPIST

## 2019-10-30 PROCEDURE — 97110 THERAPEUTIC EXERCISES: CPT | Mod: GP | Performed by: PHYSICAL THERAPIST

## 2019-10-30 ASSESSMENT — ACTIVITIES OF DAILY LIVING (ADL)
HEAVY_WORK: MODERATE DIFFICULTY
LIGHT_TO_MODERATE_WORK: EXTREME DIFFICULTY
WALKING_INITIALLY: MODERATE DIFFICULTY
GOING_UP_1_FLIGHT_OF_STAIRS: SLIGHT DIFFICULTY
HOS_ADL_COUNT: 16
HOW_WOULD_YOU_RATE_YOUR_CURRENT_LEVEL_OF_FUNCTION_DURING_YOUR_USUAL_ACTIVITIES_OF_DAILY_LIVING_FROM_0_TO_100_WITH_100_BEING_YOUR_LEVEL_OF_FUNCTION_PRIOR_TO_YOUR_HIP_PROBLEM_AND_0_BEING_THE_INABILITY_TO_PERFORM_ANY_OF_YOUR_USUAL_DAILY_ACTIVITIES?: 40
HOS_ADL_SCORE(%): 51.56
RECREATIONAL_ACTIVITIES: SLIGHT DIFFICULTY
DEEP_SQUATTING: MODERATE DIFFICULTY
ROLLING_OVER_IN_BED: EXTREME DIFFICULTY
HOS_ADL_HIGHEST_POTENTIAL_SCORE: 64
WALKING_APPROXIMATELY_10_MINUTES: MODERATE DIFFICULTY
TWISTING/PIVOTING_ON_INVOLVED_LEG: EXTREME DIFFICULTY
WALKING_DOWN_STEEP_HILLS: SLIGHT DIFFICULTY
WALKING_UP_STEEP_HILLS: SLIGHT DIFFICULTY
WALKING_15_MINUTES_OR_GREATER: MODERATE DIFFICULTY
GOING_DOWN_1_FLIGHT_OF_STAIRS: SLIGHT DIFFICULTY
STANDING_FOR_15_MINUTES: MODERATE DIFFICULTY
HOS_ADL_ITEM_SCORE_TOTAL: 33
STEPPING_UP_AND_DOWN_CURBS: MODERATE DIFFICULTY
GETTING_INTO_AND_OUT_OF_AN_AVERAGE_CAR: EXTREME DIFFICULTY
SITTING_FOR_15_MINUTES: EXTREME DIFFICULTY
PUTTING_ON_SOCKS_AND_SHOES: MODERATE DIFFICULTY

## 2019-10-30 NOTE — PROGRESS NOTES
[facility] Initial Evaluation  Subjective:  Pt presents to PT with primary complaint of L hip pain, states she had an injextion into the hip. Pt pointing to the lateral and posterior greater trochanter on the L side as the area of pain. Pain worst with standing, walking and long term sitting. No leg pain except about the hip. States she had x-rays demonstrating some arthritis in the hip as well. Pt with referral dated 10/25/19 for PT evaluate and treat for 6 visits. Pain started for no apparent reason. She has history of MARY on the R side.     The history is provided by the patient. No  was used.   Type of problem:  Left hip   Condition occurred with:  Insidious onset. This is a new condition    Patient reports pain:  Greater trochanter and lateral. Radiates to:  No radiation. Associated symptoms:  Loss of motion/stiffness, buckling/giving out and loss of strength. Symptoms are exacerbated by walking and standing (long term sitting ) and relieved by nothing.    Where condition occurred: for unknown reasons.  and reported as 8/10 on pain scale. General health as reported by patient is poor. Pertinent medical history includes:  Diabetes, high blood pressure, kidney disease, migraines/headaches and depression.   Other medical allergies details: Sulfa.  Surgeries include:  Orthopedic surgery.  Current medications:  Steroids and high blood pressure medication.   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  Pain is described as aching and is constant. Pain is the same all the time (depends on activity ). Since onset symptoms are unchanged. Special tests:  X-ray. Previous treatment includes other (Injection). There was none improvement following previous treatment.   Patient is retried . Work activity restrictions: retired     Barriers include:  None as reported by patient.  Red flags:  None as reported by patient.                      Objective:    Gait:  Slow and  guarded   Gait Type:  Antalgic   Weight Bearing Status:  WBAT   Assistive Devices:  Walker                                                   Hip Evaluation  HIP AROM:    Flexion: Left: 90    Right:  90    Extension: Left: 0   Right:   Abduction: Left:  25    Right:  25      Internal Rotation: Left: 10    Right: n/t  External Rotation: Left: 45    Right: 40        Hip Strength:    Flexion:   Left: 3+/5   Pain:  Right: 4 and 4+/5   Pain:                    Extension:  Left: 4+/5  Pain:Right: 5-/5    Pain:    Abduction:  Left: 3+/5     Pain:Right: 4+/5    Pain:  Adduction:  Left: 4+/5    Pain:Right: 5/5   Pain:                Hip Special Testing:    Left hip positive for the following special tests:  Piriformis; Nelson and Dre's  Left hip negative for the following special tests:  Fadir/Labrum; Distraction or SLR      Hip Palpation:    Left hip tenderness present at:   Greater Trachanter; IT Band; Abductors and Bursa  Left hip tenderness not present at:  hip flexors; Piriformis; PSIS; ASIS; Adductors; Iliac Crest or Gluteus Medius               General     ROS    Assessment/Plan:    Patient is a 74 year old female with left side hip complaints.    Patient has the following significant findings with corresponding treatment plan.                Diagnosis 1:  L hip pain, trochanteric bursitis, IT band  Pain -  hot/cold therapy, US, manual therapy, self management, education and home program  Decreased strength - therapeutic exercise, therapeutic activities and home program  Inflammation - cold therapy and self management/home program  Impaired gait - gait training and home program  Impaired muscle performance - neuro re-education and home program  Decreased function - therapeutic activities and home program    Therapy Evaluation Codes:   1) History comprised of:   Personal factors that impact the plan of care:      Past/current experiences and Time since onset of symptoms.    Comorbidity factors that impact the plan of  care are:      Diabetes, Depression and High blood pressure.     Medications impacting care: Steroids.  2) Examination of Body Systems comprised of:   Body structures and functions that impact the plan of care:      Hip.   Activity limitations that impact the plan of care are:      Sitting, Squatting/kneeling, Stairs, Standing and Walking.  3) Clinical presentation characteristics are:   Stable/Uncomplicated.  4) Decision-Making    Low complexity using standardized patient assessment instrument and/or measureable assessment of functional outcome.  Cumulative Therapy Evaluation is: Low complexity.    Previous and current functional limitations:  (See Goal Flow Sheet for this information)    Short term and Long term goals: (See Goal Flow Sheet for this information)     Communication ability:  Patient appears to be able to clearly communicate and understand verbal and written communication and follow directions correctly.  Treatment Explanation - The following has been discussed with the patient:   RX ordered/plan of care  Anticipated outcomes  Possible risks and side effects  This patient would benefit from PT intervention to resume normal activities.   Rehab potential is good.    Frequency:  1 X week, once daily  Duration:  for 6 weeks  Discharge Plan:  Achieve all LTG.  Independent in home treatment program.  Reach maximal therapeutic benefit.    Please refer to the daily flowsheet for treatment today, total treatment time and time spent performing 1:1 timed codes.

## 2019-10-30 NOTE — LETTER
[facility] PHYSICAL THERAPY  [address]  [facility] [id number] [phone]    2019    Re: Makenzie Nevarez   :   1945  MRN:  [id number]   REFERRING PHYSICIAN:   Jan Hollis    [facility]    Date of Initial Evaluation:  10/30/19  Visits:  Rxs Used: 1  Reason for Referral:  Hip pain, left    EVALUATION SUMMARY    [facility]  Subjective:  Pt presents to PT with primary complaint of L hip pain, states she had an injextion into the hip. Pt pointing to the lateral and posterior greater trochanter on the L side as the area of pain. Pain worst with standing, walking and long term sitting. No leg pain except about the hip. States she had x-rays demonstrating some arthritis in the hip as well. Pt with referral dated 10/25/19 for PT evaluate and treat for 6 visits. Pain started for no apparent reason. She has history of MRAY on the R side.     The history is provided by the patient. No  was used.   Type of problem:  Left hip   Condition occurred with:  Insidious onset. This is a new condition    Patient reports pain:  Greater trochanter and lateral. Radiates to:  No radiation. Associated symptoms:  Loss of motion/stiffness, buckling/giving out and loss of strength. Symptoms are exacerbated by walking and standing (long term sitting ) and relieved by nothing.    Where condition occurred: for unknown reasons.  and reported as 8/10 on pain scale. General health as reported by patient is poor. Pertinent medical history includes:  Diabetes, high blood pressure, kidney disease, migraines/headaches and depression.   Other medical allergies details: Sulfa.  Surgeries include:  Orthopedic surgery.  Current medications:  Steroids and high blood pressure medication.   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  Pain is described as aching and is  constant. Pain is the same all the time (depends on activity ). Since onset symptoms are unchanged. Special tests:  X-ray. Previous treatment includes other (Injection). There was none improvement following previous treatment.   Patient is retried . Work activity restrictions: retired     Barriers include:  None as reported by patient.  Red flags:  None as reported by patient.                   Objective:    Gait:  Slow and guarded   Gait Type:  Antalgic   Weight Bearing Status:  WBAT   Assistive Devices:  Walker      Hip Evaluation  HIP AROM:    Flexion: Left: 90    Right:  90  Extension: Left: 0   Right:   Abduction: Left:  25    Right:  25  Internal Rotation: Left: 10    Right: n/t  External Rotation: Left: 45    Right: 40      Hip Strength:    Flexion:   Left: 3+/5   Pain:  Right: 4 and 4+/5   Pain:               Extension:  Left: 4+/5  Pain:Right: 5-/5    Pain:    Abduction:  Left: 3+/5     Pain:Right: 4+/5    Pain:  Adduction:  Left: 4+/5    Pain:Right: 5/5   Pain:      Hip Special Testing:    Left hip positive for the following special tests:  Piriformis; Nelson and Dre's  Left hip negative for the following special tests:  Fadir/Labrum; Distraction or SLR      Hip Palpation:    Left hip tenderness present at:   Greater Trachanter; IT Band; Abductors and Bursa  Left hip tenderness not present at:  hip flexors; Piriformis; PSIS; ASIS; Adductors; Iliac Crest or Gluteus Medius     Assessment/Plan:    Patient is a 74 year old female with left side hip complaints.    Patient has the following significant findings with corresponding treatment plan.                Diagnosis 1:  L hip pain, trochanteric bursitis, IT band  Pain -  hot/cold therapy, US, manual therapy, self management, education and home program  Decreased strength - therapeutic exercise, therapeutic activities and home program  Inflammation - cold therapy and self management/home program  Impaired gait - gait training and home program  Impaired muscle  performance - neuro re-education and home program  Decreased function - therapeutic activities and home program    Therapy Evaluation Codes:   1) History comprised of:   Personal factors that impact the plan of care:      Past/current experiences and Time since onset of symptoms.    Comorbidity factors that impact the plan of care are:      Diabetes, Depression and High blood pressure.     Medications impacting care: Steroids.  2) Examination of Body Systems comprised of:   Body structures and functions that impact the plan of care:      Hip.   Activity limitations that impact the plan of care are:      Sitting, Squatting/kneeling, Stairs, Standing and Walking.  3) Clinical presentation characteristics are:   Stable/Uncomplicated.  4) Decision-Making    Low complexity using standardized patient assessment instrument and/or measureable assessment of functional outcome.  Cumulative Therapy Evaluation is: Low complexity.    Previous and current functional limitations:  (See Goal Flow Sheet for this information)    Short term and Long term goals: (See Goal Flow Sheet for this information)     Communication ability:  Patient appears to be able to clearly communicate and understand verbal and written communication and follow directions correctly.  Treatment Explanation - The following has been discussed with the patient:   RX ordered/plan of care  Anticipated outcomes  Possible risks and side effects  This patient would benefit from PT intervention to resume normal activities.   Rehab potential is good.    Frequency:  1 X week, once daily  Duration:  for 6 weeks  Discharge Plan:  Achieve all LTG.  Independent in home treatment program.  Reach maximal therapeutic benefit.      Thank you for your referral.    INQUIRIES  Therapist: Dar Thorpe   [facility]  [address]  [facility] [address]  Phone: [phone]  Fax: [phone]

## 2019-11-13 ENCOUNTER — THERAPY VISIT (OUTPATIENT)
Dept: PHYSICAL THERAPY | Facility: CLINIC | Age: 74
End: 2019-11-13
Payer: COMMERCIAL

## 2019-11-13 DIAGNOSIS — M25.552 HIP PAIN, LEFT: ICD-10-CM

## 2019-11-13 PROCEDURE — 97110 THERAPEUTIC EXERCISES: CPT | Mod: GP | Performed by: PHYSICAL THERAPIST

## 2019-11-13 PROCEDURE — 97140 MANUAL THERAPY 1/> REGIONS: CPT | Mod: GP | Performed by: PHYSICAL THERAPIST

## 2019-11-20 ENCOUNTER — THERAPY VISIT (OUTPATIENT)
Dept: PHYSICAL THERAPY | Facility: CLINIC | Age: 74
End: 2019-11-20
Payer: COMMERCIAL

## 2019-11-20 DIAGNOSIS — M25.552 HIP PAIN, LEFT: ICD-10-CM

## 2019-11-20 PROCEDURE — 97140 MANUAL THERAPY 1/> REGIONS: CPT | Mod: GP | Performed by: PHYSICAL THERAPIST

## 2019-11-20 PROCEDURE — 97110 THERAPEUTIC EXERCISES: CPT | Mod: GP | Performed by: PHYSICAL THERAPIST

## 2020-01-14 ENCOUNTER — RECORDS - HEALTHEAST (OUTPATIENT)
Dept: LAB | Facility: CLINIC | Age: 75
End: 2020-01-14

## 2020-01-15 LAB
ANION GAP SERPL CALCULATED.3IONS-SCNC: 12 MMOL/L (ref 5–18)
BUN SERPL-MCNC: 101 MG/DL (ref 8–28)
CALCIUM SERPL-MCNC: 8.7 MG/DL (ref 8.5–10.5)
CHLORIDE BLD-SCNC: 103 MMOL/L (ref 98–107)
CO2 SERPL-SCNC: 22 MMOL/L (ref 22–31)
CREAT SERPL-MCNC: 2.7 MG/DL (ref 0.6–1.1)
ERYTHROCYTE [DISTWIDTH] IN BLOOD BY AUTOMATED COUNT: 13.2 % (ref 11–14.5)
GFR SERPL CREATININE-BSD FRML MDRD: 17 ML/MIN/1.73M2
GLUCOSE BLD-MCNC: 68 MG/DL (ref 70–125)
HCT VFR BLD AUTO: 26.2 % (ref 35–47)
HGB BLD-MCNC: 8.5 G/DL (ref 12–16)
MCH RBC QN AUTO: 32 PG (ref 27–34)
MCHC RBC AUTO-ENTMCNC: 32.4 G/DL (ref 32–36)
MCV RBC AUTO: 99 FL (ref 80–100)
PLATELET # BLD AUTO: 170 THOU/UL (ref 140–440)
PMV BLD AUTO: 10.9 FL (ref 8.5–12.5)
POTASSIUM BLD-SCNC: 4.9 MMOL/L (ref 3.5–5)
RBC # BLD AUTO: 2.66 MILL/UL (ref 3.8–5.4)
SODIUM SERPL-SCNC: 137 MMOL/L (ref 136–145)
WBC: 6.4 THOU/UL (ref 4–11)

## 2020-01-16 ENCOUNTER — RECORDS - HEALTHEAST (OUTPATIENT)
Dept: LAB | Facility: CLINIC | Age: 75
End: 2020-01-16

## 2020-01-17 ENCOUNTER — RECORDS - HEALTHEAST (OUTPATIENT)
Dept: LAB | Facility: CLINIC | Age: 75
End: 2020-01-17

## 2020-01-17 LAB
ANION GAP SERPL CALCULATED.3IONS-SCNC: 13 MMOL/L (ref 5–18)
BUN SERPL-MCNC: 99 MG/DL (ref 8–28)
CALCIUM SERPL-MCNC: 8.5 MG/DL (ref 8.5–10.5)
CHLORIDE BLD-SCNC: 104 MMOL/L (ref 98–107)
CO2 SERPL-SCNC: 21 MMOL/L (ref 22–31)
CREAT SERPL-MCNC: 2.84 MG/DL (ref 0.6–1.1)
ERYTHROCYTE [DISTWIDTH] IN BLOOD BY AUTOMATED COUNT: 13.1 % (ref 11–14.5)
GFR SERPL CREATININE-BSD FRML MDRD: 16 ML/MIN/1.73M2
GLUCOSE BLD-MCNC: 59 MG/DL (ref 70–125)
HCT VFR BLD AUTO: 24.7 % (ref 35–47)
HGB BLD-MCNC: 7.9 G/DL (ref 12–16)
MCH RBC QN AUTO: 31.6 PG (ref 27–34)
MCHC RBC AUTO-ENTMCNC: 32 G/DL (ref 32–36)
MCV RBC AUTO: 99 FL (ref 80–100)
PLATELET # BLD AUTO: 206 THOU/UL (ref 140–440)
PMV BLD AUTO: 10.5 FL (ref 8.5–12.5)
POTASSIUM BLD-SCNC: 4.8 MMOL/L (ref 3.5–5)
RBC # BLD AUTO: 2.5 MILL/UL (ref 3.8–5.4)
SODIUM SERPL-SCNC: 138 MMOL/L (ref 136–145)
WBC: 6.2 THOU/UL (ref 4–11)

## 2020-01-20 LAB
ANION GAP SERPL CALCULATED.3IONS-SCNC: 13 MMOL/L (ref 5–18)
BUN SERPL-MCNC: 100 MG/DL (ref 8–28)
CALCIUM SERPL-MCNC: 8.9 MG/DL (ref 8.5–10.5)
CHLORIDE BLD-SCNC: 104 MMOL/L (ref 98–107)
CO2 SERPL-SCNC: 25 MMOL/L (ref 22–31)
CREAT SERPL-MCNC: 2.64 MG/DL (ref 0.6–1.1)
ERYTHROCYTE [DISTWIDTH] IN BLOOD BY AUTOMATED COUNT: 12.8 % (ref 11–14.5)
FERRITIN SERPL-MCNC: 130 NG/ML (ref 10–130)
GFR SERPL CREATININE-BSD FRML MDRD: 18 ML/MIN/1.73M2
GLUCOSE BLD-MCNC: 190 MG/DL (ref 70–125)
HCT VFR BLD AUTO: 27.3 % (ref 35–47)
HGB BLD-MCNC: 8.9 G/DL (ref 12–16)
IRON SATN MFR SERPL: 13 % (ref 20–50)
IRON SERPL-MCNC: 31 UG/DL (ref 42–175)
MCH RBC QN AUTO: 31.8 PG (ref 27–34)
MCHC RBC AUTO-ENTMCNC: 32.6 G/DL (ref 32–36)
MCV RBC AUTO: 98 FL (ref 80–100)
PLATELET # BLD AUTO: 320 THOU/UL (ref 140–440)
PMV BLD AUTO: 10 FL (ref 8.5–12.5)
POTASSIUM BLD-SCNC: 4.2 MMOL/L (ref 3.5–5)
RBC # BLD AUTO: 2.8 MILL/UL (ref 3.8–5.4)
SODIUM SERPL-SCNC: 142 MMOL/L (ref 136–145)
TIBC SERPL-MCNC: 239 UG/DL (ref 313–563)
TRANSFERRIN SERPL-MCNC: 191 MG/DL (ref 212–360)
VIT B12 SERPL-MCNC: 566 PG/ML (ref 213–816)
WBC: 6.8 THOU/UL (ref 4–11)

## 2020-01-21 ENCOUNTER — RECORDS - HEALTHEAST (OUTPATIENT)
Dept: LAB | Facility: CLINIC | Age: 75
End: 2020-01-21

## 2020-01-21 LAB — FOLATE SERPL-MCNC: 13.5 NG/ML

## 2020-01-22 LAB
ANION GAP SERPL CALCULATED.3IONS-SCNC: 9 MMOL/L (ref 5–18)
BUN SERPL-MCNC: 94 MG/DL (ref 8–28)
CALCIUM SERPL-MCNC: 8.5 MG/DL (ref 8.5–10.5)
CHLORIDE BLD-SCNC: 107 MMOL/L (ref 98–107)
CO2 SERPL-SCNC: 26 MMOL/L (ref 22–31)
CREAT SERPL-MCNC: 2.52 MG/DL (ref 0.6–1.1)
ERYTHROCYTE [DISTWIDTH] IN BLOOD BY AUTOMATED COUNT: 12.8 % (ref 11–14.5)
FOLATE SERPL-MCNC: 16.4 NG/ML
GFR SERPL CREATININE-BSD FRML MDRD: 19 ML/MIN/1.73M2
GLUCOSE BLD-MCNC: 77 MG/DL (ref 70–125)
HCT VFR BLD AUTO: 25.5 % (ref 35–47)
HGB BLD-MCNC: 8.1 G/DL (ref 12–16)
MCH RBC QN AUTO: 31.5 PG (ref 27–34)
MCHC RBC AUTO-ENTMCNC: 31.8 G/DL (ref 32–36)
MCV RBC AUTO: 99 FL (ref 80–100)
PLATELET # BLD AUTO: 273 THOU/UL (ref 140–440)
PMV BLD AUTO: 9.9 FL (ref 8.5–12.5)
POTASSIUM BLD-SCNC: 4.1 MMOL/L (ref 3.5–5)
RBC # BLD AUTO: 2.57 MILL/UL (ref 3.8–5.4)
SODIUM SERPL-SCNC: 142 MMOL/L (ref 136–145)
WBC: 6.5 THOU/UL (ref 4–11)

## 2020-02-13 ENCOUNTER — RECORDS - HEALTHEAST (OUTPATIENT)
Dept: LAB | Facility: CLINIC | Age: 75
End: 2020-02-13

## 2020-02-14 LAB
ANION GAP SERPL CALCULATED.3IONS-SCNC: 12 MMOL/L (ref 5–18)
BASOPHILS # BLD AUTO: 0.1 THOU/UL (ref 0–0.2)
BASOPHILS NFR BLD AUTO: 1 % (ref 0–2)
BNP SERPL-MCNC: 848 PG/ML (ref 0–137)
BUN SERPL-MCNC: 72 MG/DL (ref 8–28)
CALCIUM SERPL-MCNC: 7.8 MG/DL (ref 8.5–10.5)
CHLORIDE BLD-SCNC: 101 MMOL/L (ref 98–107)
CO2 SERPL-SCNC: 28 MMOL/L (ref 22–31)
CREAT SERPL-MCNC: 5.49 MG/DL (ref 0.6–1.1)
EOSINOPHIL # BLD AUTO: 0.2 THOU/UL (ref 0–0.4)
EOSINOPHIL NFR BLD AUTO: 4 % (ref 0–6)
ERYTHROCYTE [DISTWIDTH] IN BLOOD BY AUTOMATED COUNT: 14 % (ref 11–14.5)
GFR SERPL CREATININE-BSD FRML MDRD: 8 ML/MIN/1.73M2
GLUCOSE BLD-MCNC: 152 MG/DL (ref 70–125)
HCT VFR BLD AUTO: 26.7 % (ref 35–47)
HGB BLD-MCNC: 8.4 G/DL (ref 12–16)
LYMPHOCYTES # BLD AUTO: 1.1 THOU/UL (ref 0.8–4.4)
LYMPHOCYTES NFR BLD AUTO: 19 % (ref 20–40)
MCH RBC QN AUTO: 30.3 PG (ref 27–34)
MCHC RBC AUTO-ENTMCNC: 31.5 G/DL (ref 32–36)
MCV RBC AUTO: 96 FL (ref 80–100)
MONOCYTES # BLD AUTO: 0.6 THOU/UL (ref 0–0.9)
MONOCYTES NFR BLD AUTO: 11 % (ref 2–10)
NEUTROPHILS # BLD AUTO: 3.6 THOU/UL (ref 2–7.7)
NEUTROPHILS NFR BLD AUTO: 65 % (ref 50–70)
PLATELET # BLD AUTO: 241 THOU/UL (ref 140–440)
PMV BLD AUTO: 10.2 FL (ref 8.5–12.5)
POTASSIUM BLD-SCNC: 3.4 MMOL/L (ref 3.5–5)
RBC # BLD AUTO: 2.77 MILL/UL (ref 3.8–5.4)
SODIUM SERPL-SCNC: 141 MMOL/L (ref 136–145)
WBC: 5.6 THOU/UL (ref 4–11)

## 2020-02-24 ENCOUNTER — RECORDS - HEALTHEAST (OUTPATIENT)
Dept: LAB | Facility: CLINIC | Age: 75
End: 2020-02-24

## 2020-02-25 LAB
ANION GAP SERPL CALCULATED.3IONS-SCNC: 7 MMOL/L (ref 5–18)
BUN SERPL-MCNC: 14 MG/DL (ref 8–28)
CALCIUM SERPL-MCNC: 8 MG/DL (ref 8.5–10.5)
CHLORIDE BLD-SCNC: 101 MMOL/L (ref 98–107)
CO2 SERPL-SCNC: 31 MMOL/L (ref 22–31)
CREAT SERPL-MCNC: 2.42 MG/DL (ref 0.6–1.1)
ERYTHROCYTE [DISTWIDTH] IN BLOOD BY AUTOMATED COUNT: 14.7 % (ref 11–14.5)
GFR SERPL CREATININE-BSD FRML MDRD: 19 ML/MIN/1.73M2
GLUCOSE BLD-MCNC: 108 MG/DL (ref 70–125)
HBA1C MFR BLD: 5.5 %
HCT VFR BLD AUTO: 26.5 % (ref 35–47)
HGB BLD-MCNC: 8.2 G/DL (ref 12–16)
MCH RBC QN AUTO: 30.8 PG (ref 27–34)
MCHC RBC AUTO-ENTMCNC: 30.9 G/DL (ref 32–36)
MCV RBC AUTO: 100 FL (ref 80–100)
PLATELET # BLD AUTO: 137 THOU/UL (ref 140–440)
PMV BLD AUTO: 10.1 FL (ref 8.5–12.5)
POTASSIUM BLD-SCNC: 4.2 MMOL/L (ref 3.5–5)
RBC # BLD AUTO: 2.66 MILL/UL (ref 3.8–5.4)
SODIUM SERPL-SCNC: 139 MMOL/L (ref 136–145)
WBC: 3.7 THOU/UL (ref 4–11)

## 2020-02-26 PROBLEM — M25.552 HIP PAIN, LEFT: Status: RESOLVED | Noted: 2019-10-30 | Resolved: 2020-02-26

## 2020-02-26 NOTE — PROGRESS NOTES
DISCHARGE SUMMARY    Makenzie Nevarez was seen 3 times for evaluation and treatment.  Patient did not return for further treatment and current status is unknown.  Due to short treatment duration, no objective or functional changes were made.  Please see goal flow sheet from episode noted date below and initial evaluation for further information.  Patient is discharged from therapy and therapy episode is resolved as of 02/26/20.      Linked Episodes   Type: Episode: Status: Noted: Resolved: Last update: Updated by:   PHYSICAL THERAPY L hip pain 10/30/19 Active 10/30/2019  11/20/2019 11:23 AM Dar Thorpe, PT      Comments:

## 2022-08-19 NOTE — ED NOTES
Bed: ED17  Means of arrival:EMS  Ambulance..M.S  
Bedside report given. Care handed off to receiving nurse.   
IV removed.  Transportation arranged through [facility] service.  Charge nurse spoke with pt's  and brother in law about plans.  Pt has not had emesis since 2000.  Tolerated po.  Pt up to the bathroom.  Pt tolerated walking with walker.    
LATE ENTRY DUE TO PT CARE:    Provider notified pt and writer that pt would be discharged.  Pt stated that she has no ride until tomorrow.  She stated that her  will pick her tomorrow.  Asked pt if there was anyone else who could drive her, reply was no.  Asked if her  does not drive at night.  She responded that he does drive at night, but can not come tonight.  Writer provided her a phone to call her .  She spoke with her ,  then spoke with writer.   loudly spoke with writer stating that he would not drive in this weather and that we could keep her here until tomorrow.  At this time there are no funds available for the hospital to provide cab service for pt.  Pt has no leon on her.  Spoke with charge nurse about issue.  Cab service would be $45-50.  Charge in to speak with pt.   
LATE NOTE DUE TO PT CARE:    Pt up to the bathroom using her walker.  Pt able to provide a urine sample and a small watery stool used to collect occult stool blood test. Pt back to bed.  Pt continues to feel nauseated.  Additional dose pf Zofran administered.  Ice chips provided for pt to suck on as she stated her mouth was dry.    
Provider in room at this time speaking with pt about plan of care.   
Pt continues to feel nauseated.  Pt states that she had an emesis, small amount of clear spit in the emesis bag.   
Pt has drank 2 glasses of apple juice and retained.  Pt stating she is feeling better, but still nauseated.  
Pt here with NVD x 2 days. Pt not able to tell me about her meds or when she took them last. She has homecare but unable to tell me when they were out, or what she takes when. Medication reconciliation not finished in triage. Primary nurse will need to address before admission.  
No